# Patient Record
(demographics unavailable — no encounter records)

---

## 2018-03-14 NOTE — PDOC
History of Present Illness





- General


History Source: Patient


Exam Limitations: Other (Substance Abuse)





- History of Present Illness


Initial Comments: 





03/14/18 12:09


The patient is a 32 year old male, BIBA from [facility]  with a 

significant past medical history of polysubstance abuse, who presents to the 

emergency department with substance abuse and medical clearance for detox. 

Patient upon arrival, is drowsy but arousable. Patient notes his drug of choice 

is heroin (administered through nasal inhalation) and xanax,  which he reports 

taking daily. This HPI is limited due to patients clinical condition at time of 

the exam. 





Allergies: NKA


Past surgical history: None reported.


Social History: See HPI








<Chad Cottrell - Last Filed: 03/14/18 12:09>





<Shahab Garcia - Last Filed: 03/14/18 15:13>





- General


Chief Complaint: Substance Abuse


Stated Complaint: SUBSTANCE ABUSE


Time Seen by Provider: 03/14/18 11:26





Past History





<Chad Cottrell - Last Filed: 03/14/18 12:09>





- Past Medical History


Anemia: No


Asthma: Yes


Cancer: No


Cardiac Disorders: No


CVA: No


COPD: No


CHF: No


Dementia: No


Diabetes: No


GI Disorders: No


 Disorders: No


HTN: No


Hypercholesterolemia: No


Liver Disease: No


Seizures: No


Thyroid Disease: No





- Reproductive History


Testicular Surgery: No





- Suicide/Smoking/Psychosocial Hx


Smoking History: Current every day smoker


Have you smoked in the past 12 months: Yes


Number of Cigarettes Smoked Daily: 20


Information on smoking cessation initiated: No


'Breaking Loose' booklet given: 04/12/17


Hx Alcohol Use: No


Drug/Substance Use Hx: Yes


Substance Use Type: Cocaine, Heroin


Hx Substance Use Treatment: No





<Shahab Garcia - Last Filed: 03/14/18 15:13>





- Past Medical History


Allergies/Adverse Reactions: 


 Allergies











Allergy/AdvReac Type Severity Reaction Status Date / Time


 


No Known Allergies Allergy   Verified 03/14/18 11:09











Home Medications: 


Ambulatory Orders





Unobtainable [Unobtainable]  03/14/18 











**Review of Systems





- Review of Systems


Able to Perform ROS?: No (Substance Abuse)





<Chad Cottrell - Last Filed: 03/14/18 12:09>





*Physical Exam





- Vital Signs


 Last Vital Signs











Temp Pulse Resp BP Pulse Ox


 


 98.1 F   61   18   95/57   98 


 


 03/14/18 11:06  03/14/18 11:06  03/14/18 11:06  03/14/18 11:06  03/14/18 11:06














- Physical Exam


Comments: 





03/14/18 12:10


CONSTITUTIONAL: Well-appearing; well-nourished;Lethargic arousable to sternal 

rub.


HEAD: Normocephalic; atraumatic


EYES: Pupils Pinpoint 


ENMT: External appears normal; normal oropharynx


NECK: Supple; non-tender; no cervical lymphadenopathy


CARD: Normal S1, S2; no murmurs, rubs, or gallops


RESP: Normal chest excursion with respiration; breath sounds clear and equal 

bilaterally; no wheezes, rhonchi, or rales


ABD: Soft, non-distended; non-tender; no palpable organomegaly, no palpable 

hernias


EXT: Normal ROM in all four extremities; non-tender to palpation; distal pulses 

intact


SKIN: Warm, dry, no rash


NEURO: No focal neurological deficiencies.








<Chad Cottrell - Last Filed: 03/14/18 12:09>





- Vital Signs


 Last Vital Signs











Temp Pulse Resp BP Pulse Ox


 


 98.1 F   61   18   95/57   98 


 


 03/14/18 11:06  03/14/18 11:06  03/14/18 11:06  03/14/18 11:06  03/14/18 11:06














<Shahab Garcia - Last Filed: 03/14/18 15:13>





Medical Decision Making





- Medical Decision Making





03/14/18 15:08


Patient is a 32-year-old male who presents to the ER with lethargy after taking 

an unknown amount of heroin and Xanax. No evidence of trauma is present. 

Patient responded to administration of 0.4 mg of Narcan IV. Patient observed 

for a period of 4 hours, currently, patient is wide awake, alert, oriented 3, 

with stable gait. Will discharge with [facility] follow-up.





<Shahab Garcia - Last Filed: 03/14/18 15:13>





*DC/Admit/Observation/Transfer





- Attestations


Scribe Attestion: 





03/14/18 12:10





Documentation prepared by Chad Cottrell, acting as medical scribe for Shahab Garcia MD.








<Chad Cottrell - Last Filed: 03/14/18 12:09>





<Shahab Garcia - Last Filed: 03/14/18 15:13>


Diagnosis at time of Disposition: 


 Heroin use, Benzodiazepine abuse








- Discharge Dispostion


Disposition: HOME


Condition at time of disposition: Stable





- Referrals


Referrals: 


[facility] detox, one day [Other]





- Patient Instructions


Printed Discharge Instructions:  [facility]


Print Language: Uz[place]